# Patient Record
Sex: FEMALE | Race: WHITE | NOT HISPANIC OR LATINO | URBAN - METROPOLITAN AREA
[De-identification: names, ages, dates, MRNs, and addresses within clinical notes are randomized per-mention and may not be internally consistent; named-entity substitution may affect disease eponyms.]

---

## 2019-12-24 NOTE — PATIENT DISCUSSION
Start Maxitrol gtts qid OD for 1 week and Keflex 500mg bid PO for 1 week. bothe E-Rx sent to pharmacy.

## 2020-02-24 ENCOUNTER — NEW PATIENT (OUTPATIENT)
Dept: URBAN - METROPOLITAN AREA CLINIC 26 | Facility: CLINIC | Age: 55
End: 2020-02-24

## 2020-02-24 VITALS
DIASTOLIC BLOOD PRESSURE: 118 MMHG | WEIGHT: 261 LBS | HEART RATE: 99 BPM | SYSTOLIC BLOOD PRESSURE: 177 MMHG | BODY MASS INDEX: 49.28 KG/M2 | HEIGHT: 61 IN

## 2020-02-24 DIAGNOSIS — H34.8310: ICD-10-CM

## 2020-02-24 DIAGNOSIS — I10: ICD-10-CM

## 2020-02-24 DIAGNOSIS — H40.1131: ICD-10-CM

## 2020-02-24 DIAGNOSIS — H35.372: ICD-10-CM

## 2020-02-24 PROCEDURE — 92250 FUNDUS PHOTOGRAPHY W/I&R: CPT

## 2020-02-24 PROCEDURE — 99204 OFFICE O/P NEW MOD 45 MIN: CPT

## 2020-02-24 PROCEDURE — 92134 CPTRZ OPH DX IMG PST SGM RTA: CPT

## 2020-02-24 ASSESSMENT — VISUAL ACUITY
OS_PH: 20/50-2
OD_PH: 20/100+2
OS_SC: 20/400-2
OS_CC: 20/15-1
OD_SC: CF 4FT
OD_CC: 20/40+2

## 2020-02-24 ASSESSMENT — TONOMETRY
OD_IOP_MMHG: 20
OS_IOP_MMHG: 19

## 2020-02-27 ENCOUNTER — CLINIC PROCEDURE ONLY (OUTPATIENT)
Dept: URBAN - METROPOLITAN AREA CLINIC 26 | Facility: CLINIC | Age: 55
End: 2020-02-27

## 2020-02-27 DIAGNOSIS — H34.8310: ICD-10-CM

## 2020-02-27 PROCEDURE — 67028 INJECTION EYE DRUG: CPT

## 2020-02-27 ASSESSMENT — VISUAL ACUITY: OD_CC: 20/40

## 2020-02-27 ASSESSMENT — TONOMETRY: OD_IOP_MMHG: 16

## 2020-04-02 ENCOUNTER — CLINICAL PROCEDURE AND DIAGNOSTIC TESTING ONLY (OUTPATIENT)
Dept: URBAN - METROPOLITAN AREA CLINIC 26 | Facility: CLINIC | Age: 55
End: 2020-04-02

## 2020-04-02 DIAGNOSIS — H35.372: ICD-10-CM

## 2020-04-02 DIAGNOSIS — H34.8310: ICD-10-CM

## 2020-04-02 PROCEDURE — 92250 FUNDUS PHOTOGRAPHY W/I&R: CPT

## 2020-04-02 PROCEDURE — 67028 INJECTION EYE DRUG: CPT

## 2020-04-02 ASSESSMENT — TONOMETRY: OD_IOP_MMHG: 20

## 2020-04-02 ASSESSMENT — VISUAL ACUITY: OD_CC: 20/30-1

## 2020-05-07 ENCOUNTER — CLINICAL PROCEDURE AND DIAGNOSTIC TESTING ONLY (OUTPATIENT)
Dept: URBAN - METROPOLITAN AREA CLINIC 26 | Facility: CLINIC | Age: 55
End: 2020-05-07

## 2020-05-07 DIAGNOSIS — H34.8310: ICD-10-CM

## 2020-05-07 DIAGNOSIS — H35.372: ICD-10-CM

## 2020-05-07 PROCEDURE — 67028 INJECTION EYE DRUG: CPT

## 2020-05-07 PROCEDURE — 92134 CPTRZ OPH DX IMG PST SGM RTA: CPT

## 2020-05-07 ASSESSMENT — TONOMETRY: OD_IOP_MMHG: 17

## 2020-05-07 ASSESSMENT — VISUAL ACUITY: OD_CC: 20/30+2

## 2020-06-10 ENCOUNTER — CLINICAL PROCEDURE AND DIAGNOSTIC TESTING ONLY (OUTPATIENT)
Dept: URBAN - METROPOLITAN AREA CLINIC 26 | Facility: CLINIC | Age: 55
End: 2020-06-10

## 2020-06-10 DIAGNOSIS — H35.372: ICD-10-CM

## 2020-06-10 DIAGNOSIS — H34.8310: ICD-10-CM

## 2020-06-10 PROCEDURE — 92250 FUNDUS PHOTOGRAPHY W/I&R: CPT

## 2020-06-10 PROCEDURE — 67028 INJECTION EYE DRUG: CPT

## 2020-06-10 ASSESSMENT — TONOMETRY: OD_IOP_MMHG: 19

## 2020-06-10 ASSESSMENT — VISUAL ACUITY: OD_CC: 20/25

## 2020-07-21 ENCOUNTER — FOLLOW UP AND POST INJECTION EVALUATION (OUTPATIENT)
Dept: URBAN - METROPOLITAN AREA CLINIC 26 | Facility: CLINIC | Age: 55
End: 2020-07-21

## 2020-07-21 DIAGNOSIS — H34.8310: ICD-10-CM

## 2020-07-21 DIAGNOSIS — H40.1131: ICD-10-CM

## 2020-07-21 DIAGNOSIS — I10: ICD-10-CM

## 2020-07-21 DIAGNOSIS — H35.372: ICD-10-CM

## 2020-07-21 PROCEDURE — 67028 INJECTION EYE DRUG: CPT

## 2020-07-21 PROCEDURE — 92134 CPTRZ OPH DX IMG PST SGM RTA: CPT

## 2020-07-21 PROCEDURE — 92250 FUNDUS PHOTOGRAPHY W/I&R: CPT

## 2020-07-21 PROCEDURE — 92014 COMPRE OPH EXAM EST PT 1/>: CPT

## 2020-07-21 ASSESSMENT — VISUAL ACUITY
OS_CC: 20/20
OD_CC: 20/25-2

## 2020-07-21 ASSESSMENT — TONOMETRY
OS_IOP_MMHG: 19
OD_IOP_MMHG: 20

## 2020-08-25 ENCOUNTER — CLINIC PROCEDURE ONLY (OUTPATIENT)
Dept: URBAN - METROPOLITAN AREA CLINIC 26 | Facility: CLINIC | Age: 55
End: 2020-08-25

## 2020-08-25 DIAGNOSIS — H34.8310: ICD-10-CM

## 2020-08-25 DIAGNOSIS — H35.372: ICD-10-CM

## 2020-08-25 PROCEDURE — 67028 INJECTION EYE DRUG: CPT

## 2020-08-25 PROCEDURE — 92250 FUNDUS PHOTOGRAPHY W/I&R: CPT

## 2020-08-25 ASSESSMENT — TONOMETRY: OD_IOP_MMHG: 17

## 2020-08-25 ASSESSMENT — VISUAL ACUITY: OD_CC: 20/30

## 2020-09-29 ENCOUNTER — CLINICAL PROCEDURE AND DIAGNOSTIC TESTING ONLY (OUTPATIENT)
Dept: URBAN - METROPOLITAN AREA CLINIC 26 | Facility: CLINIC | Age: 55
End: 2020-09-29

## 2020-09-29 DIAGNOSIS — H34.8310: ICD-10-CM

## 2020-09-29 DIAGNOSIS — H35.372: ICD-10-CM

## 2020-09-29 PROCEDURE — 92134 CPTRZ OPH DX IMG PST SGM RTA: CPT

## 2020-09-29 PROCEDURE — 67028 INJECTION EYE DRUG: CPT

## 2020-09-29 ASSESSMENT — TONOMETRY: OD_IOP_MMHG: 15

## 2020-09-29 ASSESSMENT — VISUAL ACUITY: OD_CC: 20/25

## 2020-11-03 ENCOUNTER — CLINICAL PROCEDURE AND DIAGNOSTIC TESTING ONLY (OUTPATIENT)
Dept: URBAN - METROPOLITAN AREA CLINIC 26 | Facility: CLINIC | Age: 55
End: 2020-11-03

## 2020-11-03 DIAGNOSIS — H34.8310: ICD-10-CM

## 2020-11-03 DIAGNOSIS — H35.372: ICD-10-CM

## 2020-11-03 PROCEDURE — 92250 FUNDUS PHOTOGRAPHY W/I&R: CPT

## 2020-11-03 PROCEDURE — 67028 INJECTION EYE DRUG: CPT

## 2020-11-03 ASSESSMENT — VISUAL ACUITY: OD_CC: 20/25+2

## 2020-11-03 ASSESSMENT — TONOMETRY: OD_IOP_MMHG: 20

## 2020-12-08 ENCOUNTER — FOLLOW UP AND POST INJECTION EVALUATION (OUTPATIENT)
Dept: URBAN - METROPOLITAN AREA CLINIC 26 | Facility: CLINIC | Age: 55
End: 2020-12-08

## 2020-12-08 DIAGNOSIS — H35.372: ICD-10-CM

## 2020-12-08 DIAGNOSIS — H04.123: ICD-10-CM

## 2020-12-08 DIAGNOSIS — I10: ICD-10-CM

## 2020-12-08 DIAGNOSIS — H34.8310: ICD-10-CM

## 2020-12-08 DIAGNOSIS — H02.834: ICD-10-CM

## 2020-12-08 DIAGNOSIS — H02.831: ICD-10-CM

## 2020-12-08 DIAGNOSIS — H40.1131: ICD-10-CM

## 2020-12-08 PROCEDURE — 92134 CPTRZ OPH DX IMG PST SGM RTA: CPT

## 2020-12-08 PROCEDURE — 92250 FUNDUS PHOTOGRAPHY W/I&R: CPT

## 2020-12-08 PROCEDURE — 67028 INJECTION EYE DRUG: CPT

## 2020-12-08 PROCEDURE — 92014 COMPRE OPH EXAM EST PT 1/>: CPT

## 2020-12-08 ASSESSMENT — VISUAL ACUITY
OS_CC: 20/20
OD_CC: 20/20-1

## 2020-12-08 ASSESSMENT — TONOMETRY
OD_IOP_MMHG: 16
OS_IOP_MMHG: 17

## 2021-01-14 NOTE — PATIENT DISCUSSION
Monitor. Pt to call with any vision changes, increase in floaters, or if seeing flashes, dark shadows, or a curtain/veil across vision.

## 2021-01-18 ENCOUNTER — CLINICAL PROCEDURE AND DIAGNOSTIC TESTING ONLY (OUTPATIENT)
Dept: URBAN - METROPOLITAN AREA CLINIC 26 | Facility: CLINIC | Age: 56
End: 2021-01-18

## 2021-01-18 DIAGNOSIS — H34.8310: ICD-10-CM

## 2021-01-18 DIAGNOSIS — H35.372: ICD-10-CM

## 2021-01-18 PROCEDURE — 92250 FUNDUS PHOTOGRAPHY W/I&R: CPT

## 2021-01-18 PROCEDURE — 67028 INJECTION EYE DRUG: CPT

## 2021-01-18 ASSESSMENT — TONOMETRY
OD_IOP_MMHG: 23
OD_IOP_MMHG: 20

## 2021-01-18 ASSESSMENT — VISUAL ACUITY: OD_CC: 20/30+2

## 2021-02-23 PROBLEM — D25.9 FIBROID UTERUS: Status: ACTIVE | Noted: 2021-02-23

## 2021-03-08 ENCOUNTER — CLINICAL PROCEDURE AND DIAGNOSTIC TESTING ONLY (OUTPATIENT)
Dept: URBAN - METROPOLITAN AREA CLINIC 26 | Facility: CLINIC | Age: 56
End: 2021-03-08

## 2021-03-08 DIAGNOSIS — H35.372: ICD-10-CM

## 2021-03-08 DIAGNOSIS — H34.8310: ICD-10-CM

## 2021-03-08 PROCEDURE — 67028 INJECTION EYE DRUG: CPT

## 2021-03-08 PROCEDURE — 92250 FUNDUS PHOTOGRAPHY W/I&R: CPT

## 2021-03-08 PROCEDURE — 92134 CPTRZ OPH DX IMG PST SGM RTA: CPT

## 2021-03-08 ASSESSMENT — TONOMETRY: OD_IOP_MMHG: 20

## 2021-03-08 ASSESSMENT — VISUAL ACUITY: OD_CC: 20/20-1

## 2021-03-09 PROBLEM — T81.30XA SUPERFICIAL DEHISCENCE OF WOUND: Status: ACTIVE | Noted: 2021-03-09

## 2021-05-03 ENCOUNTER — FOLLOW UP AND POST INJECTION EVALUATION (OUTPATIENT)
Dept: URBAN - METROPOLITAN AREA CLINIC 26 | Facility: CLINIC | Age: 56
End: 2021-05-03

## 2021-05-03 VITALS — HEIGHT: 55 IN | SYSTOLIC BLOOD PRESSURE: 126 MMHG | HEART RATE: 89 BPM | DIASTOLIC BLOOD PRESSURE: 96 MMHG

## 2021-05-03 DIAGNOSIS — H02.834: ICD-10-CM

## 2021-05-03 DIAGNOSIS — H34.8310: ICD-10-CM

## 2021-05-03 DIAGNOSIS — H02.831: ICD-10-CM

## 2021-05-03 DIAGNOSIS — H35.372: ICD-10-CM

## 2021-05-03 DIAGNOSIS — H40.1131: ICD-10-CM

## 2021-05-03 DIAGNOSIS — H04.123: ICD-10-CM

## 2021-05-03 DIAGNOSIS — I10: ICD-10-CM

## 2021-05-03 PROCEDURE — 92134 CPTRZ OPH DX IMG PST SGM RTA: CPT

## 2021-05-03 PROCEDURE — 92014 COMPRE OPH EXAM EST PT 1/>: CPT

## 2021-05-03 PROCEDURE — 92250 FUNDUS PHOTOGRAPHY W/I&R: CPT

## 2021-05-03 ASSESSMENT — TONOMETRY
OS_IOP_MMHG: 26
OD_IOP_MMHG: 21
OD_IOP_MMHG: 27
OS_IOP_MMHG: 24
OD_IOP_MMHG: 24
OS_IOP_MMHG: 22

## 2021-05-03 ASSESSMENT — VISUAL ACUITY
OS_CC: 20/20-1
OD_CC: 20/20-1

## 2021-05-04 ENCOUNTER — ADDENDUM (OUTPATIENT)
Dept: URBAN - METROPOLITAN AREA CLINIC 26 | Facility: CLINIC | Age: 56
End: 2021-05-04

## 2021-05-11 ENCOUNTER — CLINICAL PROCEDURE AND DIAGNOSTIC TESTING ONLY (OUTPATIENT)
Dept: URBAN - METROPOLITAN AREA CLINIC 26 | Facility: CLINIC | Age: 56
End: 2021-05-11

## 2021-05-11 DIAGNOSIS — H34.8310: ICD-10-CM

## 2021-05-11 PROCEDURE — 67028 INJECTION EYE DRUG: CPT

## 2021-05-11 ASSESSMENT — VISUAL ACUITY: OD_CC: 20/20-2

## 2021-05-11 ASSESSMENT — TONOMETRY
OS_IOP_MMHG: 18
OD_IOP_MMHG: 22

## 2021-07-20 ENCOUNTER — CLINICAL PROCEDURE AND DIAGNOSTIC TESTING ONLY (OUTPATIENT)
Dept: URBAN - METROPOLITAN AREA CLINIC 26 | Facility: CLINIC | Age: 56
End: 2021-07-20

## 2021-07-20 DIAGNOSIS — H35.372: ICD-10-CM

## 2021-07-20 DIAGNOSIS — H34.8310: ICD-10-CM

## 2021-07-20 PROCEDURE — 92134 CPTRZ OPH DX IMG PST SGM RTA: CPT

## 2021-07-20 PROCEDURE — 67028 INJECTION EYE DRUG: CPT

## 2021-07-20 PROCEDURE — 92250 FUNDUS PHOTOGRAPHY W/I&R: CPT

## 2021-07-20 ASSESSMENT — TONOMETRY: OD_IOP_MMHG: 16

## 2021-07-20 ASSESSMENT — VISUAL ACUITY: OD_CC: 20/30

## 2021-10-05 ENCOUNTER — FOLLOW UP AND POST INJECTION EVALUATION (OUTPATIENT)
Dept: URBAN - METROPOLITAN AREA CLINIC 26 | Facility: CLINIC | Age: 56
End: 2021-10-05

## 2021-10-05 VITALS — WEIGHT: 263.4 LBS | HEIGHT: 60 IN | BODY MASS INDEX: 51.71 KG/M2

## 2021-10-05 DIAGNOSIS — H35.372: ICD-10-CM

## 2021-10-05 DIAGNOSIS — H40.1131: ICD-10-CM

## 2021-10-05 DIAGNOSIS — H04.123: ICD-10-CM

## 2021-10-05 DIAGNOSIS — H34.8310: ICD-10-CM

## 2021-10-05 DIAGNOSIS — I10: ICD-10-CM

## 2021-10-05 PROCEDURE — 92250 FUNDUS PHOTOGRAPHY W/I&R: CPT

## 2021-10-05 PROCEDURE — 92014 COMPRE OPH EXAM EST PT 1/>: CPT

## 2021-10-05 PROCEDURE — 92134 CPTRZ OPH DX IMG PST SGM RTA: CPT

## 2021-10-05 PROCEDURE — 67028 INJECTION EYE DRUG: CPT

## 2021-10-05 ASSESSMENT — VISUAL ACUITY
OS_CC: 20/20
OD_CC: 20/20

## 2021-10-05 ASSESSMENT — TONOMETRY
OS_IOP_MMHG: 18
OD_IOP_MMHG: 18

## 2021-12-08 ENCOUNTER — CLINIC PROCEDURE ONLY (OUTPATIENT)
Dept: URBAN - METROPOLITAN AREA CLINIC 26 | Facility: CLINIC | Age: 56
End: 2021-12-08

## 2021-12-08 DIAGNOSIS — H40.1131: ICD-10-CM

## 2021-12-08 DIAGNOSIS — H34.8310: ICD-10-CM

## 2021-12-08 DIAGNOSIS — H35.372: ICD-10-CM

## 2021-12-08 PROCEDURE — 92134 CPTRZ OPH DX IMG PST SGM RTA: CPT

## 2021-12-08 PROCEDURE — 92250 FUNDUS PHOTOGRAPHY W/I&R: CPT

## 2021-12-08 PROCEDURE — 67028 INJECTION EYE DRUG: CPT

## 2021-12-08 ASSESSMENT — VISUAL ACUITY: OD_CC: 20/20-2

## 2021-12-08 ASSESSMENT — TONOMETRY: OD_IOP_MMHG: 18

## 2022-02-08 ENCOUNTER — ADDENDUM (OUTPATIENT)
Dept: URBAN - METROPOLITAN AREA CLINIC 26 | Facility: CLINIC | Age: 57
End: 2022-02-08

## 2022-02-09 ENCOUNTER — CLINIC PROCEDURE ONLY (OUTPATIENT)
Dept: URBAN - METROPOLITAN AREA CLINIC 26 | Facility: CLINIC | Age: 57
End: 2022-02-09

## 2022-02-09 DIAGNOSIS — H35.372: ICD-10-CM

## 2022-02-09 DIAGNOSIS — H34.8310: ICD-10-CM

## 2022-02-09 PROCEDURE — 92134 CPTRZ OPH DX IMG PST SGM RTA: CPT

## 2022-02-09 PROCEDURE — 67028 INJECTION EYE DRUG: CPT

## 2022-02-09 PROCEDURE — 92250 FUNDUS PHOTOGRAPHY W/I&R: CPT

## 2022-02-09 ASSESSMENT — TONOMETRY: OD_IOP_MMHG: 20

## 2022-04-20 ENCOUNTER — FOLLOW UP (OUTPATIENT)
Dept: URBAN - METROPOLITAN AREA CLINIC 26 | Facility: CLINIC | Age: 57
End: 2022-04-20

## 2022-04-20 VITALS — BODY MASS INDEX: 52.61 KG/M2 | HEIGHT: 60 IN | WEIGHT: 268 LBS

## 2022-04-20 DIAGNOSIS — H04.123: ICD-10-CM

## 2022-04-20 DIAGNOSIS — H34.8310: ICD-10-CM

## 2022-04-20 DIAGNOSIS — H35.372: ICD-10-CM

## 2022-04-20 DIAGNOSIS — I10: ICD-10-CM

## 2022-04-20 DIAGNOSIS — H40.1131: ICD-10-CM

## 2022-04-20 PROCEDURE — 67028 INJECTION EYE DRUG: CPT

## 2022-04-20 PROCEDURE — 92250 FUNDUS PHOTOGRAPHY W/I&R: CPT

## 2022-04-20 PROCEDURE — 92134 CPTRZ OPH DX IMG PST SGM RTA: CPT

## 2022-04-20 PROCEDURE — 92014 COMPRE OPH EXAM EST PT 1/>: CPT

## 2022-04-20 ASSESSMENT — VISUAL ACUITY
OS_CC: 20/20-1
OD_CC: 20/20-2

## 2022-04-20 ASSESSMENT — TONOMETRY
OD_IOP_MMHG: 17
OS_IOP_MMHG: 18

## 2022-05-13 NOTE — PATIENT DISCUSSION
Monitor for changes. Pt to call with any vision changes, increase in floaters, or if seeing flashes, dark shadows, or a curtain/veil across vision.

## 2022-07-15 ENCOUNTER — CLINIC PROCEDURE ONLY (OUTPATIENT)
Dept: URBAN - METROPOLITAN AREA CLINIC 26 | Facility: CLINIC | Age: 57
End: 2022-07-15

## 2022-07-15 DIAGNOSIS — H34.8310: ICD-10-CM

## 2022-07-15 DIAGNOSIS — H35.372: ICD-10-CM

## 2022-07-15 PROCEDURE — 92134 CPTRZ OPH DX IMG PST SGM RTA: CPT

## 2022-07-15 PROCEDURE — 67028 INJECTION EYE DRUG: CPT

## 2022-07-15 PROCEDURE — 92250 FUNDUS PHOTOGRAPHY W/I&R: CPT

## 2022-07-15 ASSESSMENT — TONOMETRY: OD_IOP_MMHG: 19

## 2022-09-23 ENCOUNTER — CLINIC PROCEDURE ONLY (OUTPATIENT)
Dept: URBAN - METROPOLITAN AREA CLINIC 26 | Facility: CLINIC | Age: 57
End: 2022-09-23

## 2022-09-23 VITALS
DIASTOLIC BLOOD PRESSURE: 87 MMHG | WEIGHT: 273 LBS | SYSTOLIC BLOOD PRESSURE: 124 MMHG | HEART RATE: 108 BPM | BODY MASS INDEX: 53.6 KG/M2 | HEIGHT: 60 IN

## 2022-09-23 DIAGNOSIS — H04.123: ICD-10-CM

## 2022-09-23 DIAGNOSIS — H40.1131: ICD-10-CM

## 2022-09-23 DIAGNOSIS — H35.372: ICD-10-CM

## 2022-09-23 DIAGNOSIS — H34.8310: ICD-10-CM

## 2022-09-23 DIAGNOSIS — I10: ICD-10-CM

## 2022-09-23 PROCEDURE — 92250 FUNDUS PHOTOGRAPHY W/I&R: CPT

## 2022-09-23 PROCEDURE — 92014 COMPRE OPH EXAM EST PT 1/>: CPT

## 2022-09-23 PROCEDURE — 92134 CPTRZ OPH DX IMG PST SGM RTA: CPT

## 2022-09-23 PROCEDURE — 67028 INJECTION EYE DRUG: CPT

## 2022-09-23 ASSESSMENT — TONOMETRY
OS_IOP_MMHG: 14
OD_IOP_MMHG: 14

## 2022-09-23 ASSESSMENT — VISUAL ACUITY
OD_CC: 20/20-2
OS_CC: 20/20

## 2022-12-02 ENCOUNTER — CLINIC PROCEDURE ONLY (OUTPATIENT)
Dept: URBAN - METROPOLITAN AREA CLINIC 26 | Facility: CLINIC | Age: 57
End: 2022-12-02

## 2022-12-02 PROCEDURE — 92250 FUNDUS PHOTOGRAPHY W/I&R: CPT

## 2022-12-02 PROCEDURE — 67028 INJECTION EYE DRUG: CPT

## 2022-12-02 PROCEDURE — 92134 CPTRZ OPH DX IMG PST SGM RTA: CPT

## 2023-04-21 ENCOUNTER — CLINIC PROCEDURE ONLY (OUTPATIENT)
Dept: URBAN - METROPOLITAN AREA CLINIC 26 | Facility: CLINIC | Age: 58
End: 2023-04-21

## 2023-04-21 DIAGNOSIS — H35.372: ICD-10-CM

## 2023-04-21 DIAGNOSIS — H34.8310: ICD-10-CM

## 2023-04-21 PROCEDURE — 67028 INJECTION EYE DRUG: CPT

## 2023-04-21 PROCEDURE — 92250 FUNDUS PHOTOGRAPHY W/I&R: CPT

## 2023-04-21 PROCEDURE — 92134 CPTRZ OPH DX IMG PST SGM RTA: CPT

## 2023-04-21 ASSESSMENT — TONOMETRY: OD_IOP_MMHG: 19

## 2023-06-23 ENCOUNTER — COMPREHENSIVE EXAM (OUTPATIENT)
Dept: URBAN - METROPOLITAN AREA CLINIC 26 | Facility: CLINIC | Age: 58
End: 2023-06-23

## 2023-06-23 DIAGNOSIS — H04.123: ICD-10-CM

## 2023-06-23 DIAGNOSIS — H35.372: ICD-10-CM

## 2023-06-23 DIAGNOSIS — H34.8310: ICD-10-CM

## 2023-06-23 DIAGNOSIS — H40.1131: ICD-10-CM

## 2023-06-23 DIAGNOSIS — I10: ICD-10-CM

## 2023-06-23 PROCEDURE — 92250 FUNDUS PHOTOGRAPHY W/I&R: CPT

## 2023-06-23 PROCEDURE — 92134 CPTRZ OPH DX IMG PST SGM RTA: CPT

## 2023-06-23 PROCEDURE — 92014 COMPRE OPH EXAM EST PT 1/>: CPT

## 2023-06-23 PROCEDURE — 67028 INJECTION EYE DRUG: CPT

## 2023-06-23 ASSESSMENT — VISUAL ACUITY
OS_CC: 20/25+1
OD_CC: 20/25+2

## 2023-06-23 ASSESSMENT — TONOMETRY
OS_IOP_MMHG: 20
OD_IOP_MMHG: 18

## 2023-08-30 ENCOUNTER — CLINIC PROCEDURE ONLY (OUTPATIENT)
Dept: URBAN - METROPOLITAN AREA CLINIC 26 | Facility: CLINIC | Age: 58
End: 2023-08-30

## 2023-08-30 DIAGNOSIS — H34.8310: ICD-10-CM

## 2023-08-30 DIAGNOSIS — H40.1131: ICD-10-CM

## 2023-08-30 DIAGNOSIS — H35.372: ICD-10-CM

## 2023-08-30 PROCEDURE — 92250 FUNDUS PHOTOGRAPHY W/I&R: CPT

## 2023-08-30 PROCEDURE — 67028 INJECTION EYE DRUG: CPT

## 2023-08-30 PROCEDURE — 92134 CPTRZ OPH DX IMG PST SGM RTA: CPT

## 2023-11-08 ENCOUNTER — CLINIC PROCEDURE ONLY (OUTPATIENT)
Dept: URBAN - METROPOLITAN AREA CLINIC 26 | Facility: CLINIC | Age: 58
End: 2023-11-08

## 2023-11-08 DIAGNOSIS — H34.8310: ICD-10-CM

## 2023-11-08 DIAGNOSIS — H40.1131: ICD-10-CM

## 2023-11-08 DIAGNOSIS — H35.372: ICD-10-CM

## 2023-11-08 PROCEDURE — 92134 CPTRZ OPH DX IMG PST SGM RTA: CPT

## 2023-11-08 PROCEDURE — 92250 FUNDUS PHOTOGRAPHY W/I&R: CPT

## 2023-11-08 PROCEDURE — 67028 INJECTION EYE DRUG: CPT

## 2023-11-08 ASSESSMENT — TONOMETRY: OD_IOP_MMHG: 18

## 2024-01-17 ENCOUNTER — FOLLOW UP (OUTPATIENT)
Dept: URBAN - METROPOLITAN AREA CLINIC 26 | Facility: CLINIC | Age: 59
End: 2024-01-17

## 2024-01-17 VITALS — WEIGHT: 267 LBS | HEIGHT: 60 IN | BODY MASS INDEX: 52.42 KG/M2

## 2024-01-17 DIAGNOSIS — H40.1131: ICD-10-CM

## 2024-01-17 DIAGNOSIS — H02.831: ICD-10-CM

## 2024-01-17 DIAGNOSIS — H04.123: ICD-10-CM

## 2024-01-17 DIAGNOSIS — H34.8310: ICD-10-CM

## 2024-01-17 DIAGNOSIS — H35.372: ICD-10-CM

## 2024-01-17 DIAGNOSIS — I10: ICD-10-CM

## 2024-01-17 DIAGNOSIS — H02.834: ICD-10-CM

## 2024-01-17 DIAGNOSIS — H21.02: ICD-10-CM

## 2024-01-17 PROCEDURE — 92014 COMPRE OPH EXAM EST PT 1/>: CPT

## 2024-01-17 PROCEDURE — 92134 CPTRZ OPH DX IMG PST SGM RTA: CPT

## 2024-01-17 PROCEDURE — 92250 FUNDUS PHOTOGRAPHY W/I&R: CPT

## 2024-01-17 ASSESSMENT — TONOMETRY
OD_IOP_MMHG: 23
OS_IOP_MMHG: 35
OS_IOP_MMHG: 21
OD_IOP_MMHG: 22
OD_IOP_MMHG: 14
OS_IOP_MMHG: 26

## 2024-01-17 ASSESSMENT — VISUAL ACUITY
OS_CC: 20/20
OD_CC: 20/25+2

## 2024-02-07 ENCOUNTER — ADDENDUM (OUTPATIENT)
Dept: URBAN - METROPOLITAN AREA CLINIC 26 | Facility: CLINIC | Age: 59
End: 2024-02-07

## 2024-02-07 ENCOUNTER — FOLLOW UP (OUTPATIENT)
Dept: URBAN - METROPOLITAN AREA CLINIC 26 | Facility: CLINIC | Age: 59
End: 2024-02-07

## 2024-02-07 VITALS — HEIGHT: 60 IN | WEIGHT: 266 LBS | BODY MASS INDEX: 52.22 KG/M2

## 2024-02-07 DIAGNOSIS — H40.1131: ICD-10-CM

## 2024-02-07 DIAGNOSIS — H04.123: ICD-10-CM

## 2024-02-07 DIAGNOSIS — H21.02: ICD-10-CM

## 2024-02-07 DIAGNOSIS — H02.834: ICD-10-CM

## 2024-02-07 DIAGNOSIS — I10: ICD-10-CM

## 2024-02-07 DIAGNOSIS — H02.831: ICD-10-CM

## 2024-02-07 DIAGNOSIS — H35.372: ICD-10-CM

## 2024-02-07 DIAGNOSIS — H34.8310: ICD-10-CM

## 2024-02-07 PROCEDURE — 92134 CPTRZ OPH DX IMG PST SGM RTA: CPT

## 2024-02-07 PROCEDURE — 92014 COMPRE OPH EXAM EST PT 1/>: CPT

## 2024-02-07 ASSESSMENT — VISUAL ACUITY
OD_CC: 20/20-2
OS_CC: 20/20

## 2024-02-07 ASSESSMENT — TONOMETRY
OD_IOP_MMHG: 22
OS_IOP_MMHG: 22

## 2024-02-24 ENCOUNTER — OFFICE VISIT (OUTPATIENT)
Dept: URGENT CARE | Facility: CLINIC | Age: 59
End: 2024-02-24
Payer: COMMERCIAL

## 2024-02-24 VITALS
DIASTOLIC BLOOD PRESSURE: 92 MMHG | WEIGHT: 212 LBS | OXYGEN SATURATION: 97 % | SYSTOLIC BLOOD PRESSURE: 149 MMHG | RESPIRATION RATE: 17 BRPM | BODY MASS INDEX: 36.19 KG/M2 | TEMPERATURE: 98 F | HEIGHT: 64 IN | HEART RATE: 77 BPM

## 2024-02-24 DIAGNOSIS — R50.9 FEVER, UNSPECIFIED FEVER CAUSE: ICD-10-CM

## 2024-02-24 DIAGNOSIS — U07.1 COVID: Primary | ICD-10-CM

## 2024-02-24 LAB
CTP QC/QA: YES
CTP QC/QA: YES
POC MOLECULAR INFLUENZA A AGN: NEGATIVE
POC MOLECULAR INFLUENZA B AGN: NEGATIVE
SARS-COV-2 AG RESP QL IA.RAPID: POSITIVE

## 2024-02-24 PROCEDURE — 87502 INFLUENZA DNA AMP PROBE: CPT | Mod: QW,S$GLB,, | Performed by: PHYSICIAN ASSISTANT

## 2024-02-24 PROCEDURE — 87811 SARS-COV-2 COVID19 W/OPTIC: CPT | Mod: QW,S$GLB,, | Performed by: PHYSICIAN ASSISTANT

## 2024-02-24 PROCEDURE — 99203 OFFICE O/P NEW LOW 30 MIN: CPT | Mod: S$GLB,,, | Performed by: PHYSICIAN ASSISTANT

## 2024-02-24 RX ORDER — NIRMATRELVIR AND RITONAVIR 300-100 MG
KIT ORAL
Qty: 30 TABLET | Refills: 0 | Status: SHIPPED | OUTPATIENT
Start: 2024-02-24 | End: 2024-02-29

## 2024-02-24 RX ORDER — METOPROLOL TARTRATE 100 MG/1
100 TABLET ORAL 2 TIMES DAILY
COMMUNITY
Start: 2024-02-22

## 2024-02-24 NOTE — LETTER
February 24, 2024      [facility]  [address] [place], [address]  [place] [address]  Phone: [phone]  Fax: [phone]       Patient: Jennifer Briones   YOB: 1965  Date of Visit: 02/24/2024    To Whom It May Concern:    Yan Briones  was at [facility] on 02/24/2024. The patient may return to work/school on 02/28/2024 with restrictions. Patient should wear mask until 03/04/2024. If you have any questions or concerns, or if I can be of further assistance, please do not hesitate to contact me.    Sincerely,    Adam Guallpa PA-C

## 2024-02-24 NOTE — PROGRESS NOTES
"Subjective:      Patient ID: Jennifer Briones is a 58 y.o. female.    Vitals:  height is 5' 4" (1.626 m) and weight is 96.2 kg (212 lb). Her oral temperature is 98.3 °F (36.8 °C). Her blood pressure is 149/92 (abnormal) and her pulse is 77. Her respiration is 17 and oxygen saturation is 97%.     Chief Complaint: Fever    Pt is coming in for fever and congestion that began two days ago. Pt states yesterday was the worst since pt got light headed.    Fever   This is a new problem. The current episode started in the past 7 days. The problem occurs 2 to 4 times per day. The problem has been gradually worsening. The maximum temperature noted was 102 to 102.9 F. The temperature was taken using an oral thermometer. Associated symptoms include congestion, coughing, diarrhea (slight), headaches, muscle aches and nausea (slight). Pertinent negatives include no ear pain, sore throat or vomiting. She has tried nothing for the symptoms. The treatment provided no relief.   Risk factors: no recent travel        Constitution: Positive for fever.   HENT:  Positive for congestion. Negative for ear pain and sore throat.    Respiratory:  Positive for cough.    Gastrointestinal:  Positive for nausea (slight) and diarrhea (slight). Negative for vomiting.   Neurological:  Positive for headaches.      Objective:     Physical Exam   Constitutional: She is oriented to person, place, and time. She appears well-developed. She is cooperative.  Non-toxic appearance. She does not appear ill. No distress. obesity  HENT:   Head: Normocephalic and atraumatic.   Ears:   Right Ear: Hearing, tympanic membrane, external ear and ear canal normal.   Left Ear: Hearing, tympanic membrane, external ear and ear canal normal.   Nose: Nose normal. No mucosal edema, rhinorrhea or nasal deformity. No epistaxis. Right sinus exhibits no maxillary sinus tenderness and no frontal sinus tenderness. Left sinus exhibits no maxillary sinus tenderness and no frontal sinus " tenderness.   Mouth/Throat: Uvula is midline, oropharynx is clear and moist and mucous membranes are normal. No trismus in the jaw. Normal dentition. No uvula swelling. No oropharyngeal exudate, posterior oropharyngeal edema or posterior oropharyngeal erythema.   Eyes: Conjunctivae and lids are normal. No scleral icterus.   Neck: Trachea normal and phonation normal. Neck supple. No edema present. No erythema present. No neck rigidity present.   Cardiovascular: Normal rate, regular rhythm, normal heart sounds and normal pulses.   Pulmonary/Chest: Effort normal and breath sounds normal. No respiratory distress. She has no decreased breath sounds. She has no rhonchi.   Abdominal: Normal appearance.   Musculoskeletal: Normal range of motion.         General: No deformity. Normal range of motion.   Neurological: She is alert and oriented to person, place, and time. She exhibits normal muscle tone. Coordination normal.   Skin: Skin is warm, dry, intact, not diaphoretic and not pale.   Psychiatric: Her speech is normal and behavior is normal. Judgment and thought content normal.   Nursing note and vitals reviewed.      Assessment:     1. COVID    2. Fever, unspecified fever cause        Plan:       COVID  -     nirmatrelvir-ritonavir (PAXLOVID) 300 mg (150 mg x 2)-100 mg copackaged tablets (EUA); Take 3 tablets by mouth 2 (two) times daily. Each dose contains 2 nirmatrelvir (pink tablets) and 1 ritonavir (white tablet). Take all 3 tablets together  Dispense: 30 tablet; Refill: 0    Fever, unspecified fever cause  -     POCT Influenza A/B MOLECULAR  -     SARS Coronavirus 2 Antigen, POCT Manual Read        Results for orders placed or performed in visit on 02/24/24   POCT Influenza A/B MOLECULAR   Result Value Ref Range    POC Molecular Influenza A Ag Negative Negative, Not Reported    POC Molecular Influenza B Ag Negative Negative, Not Reported     Acceptable Yes    SARS Coronavirus 2 Antigen, POCT Manual  Read   Result Value Ref Range    SARS Coronavirus 2 Antigen Positive (A) Negative     Acceptable Yes

## 2024-02-27 ENCOUNTER — TELEPHONE (OUTPATIENT)
Dept: URGENT CARE | Facility: CLINIC | Age: 59
End: 2024-02-27
Payer: COMMERCIAL

## 2024-02-27 NOTE — TELEPHONE ENCOUNTER
Patient and  called clinic to discuss visit from 2/24/24 for Adam Guallpa PA-C. Stated patient tested positive for covid here on 2/24/24 and prescribed Paxlovid. Pt followed up with her PCP two days later and tested positive for Flu B and negative for Covid. Patient reports she was given medication for the flu by PCP and two shots. Advised patient to d/c paxlovid. Discussed with patient that flu swabs can be negative when early on and then test positive later on. Pt reports having no current side effects for medications. Discussed with patient and wife that all grievances will be sent to clinic manager. Yuliya Kerr MA stated she will send email to Steven about patient incident.

## 2024-02-28 ENCOUNTER — PATIENT MESSAGE (OUTPATIENT)
Dept: RESEARCH | Facility: HOSPITAL | Age: 59
End: 2024-02-28
Payer: COMMERCIAL

## 2024-04-17 ENCOUNTER — FOLLOW UP (OUTPATIENT)
Dept: URBAN - METROPOLITAN AREA CLINIC 26 | Facility: CLINIC | Age: 59
End: 2024-04-17

## 2024-04-17 DIAGNOSIS — H34.8310: ICD-10-CM

## 2024-04-17 DIAGNOSIS — H02.831: ICD-10-CM

## 2024-04-17 DIAGNOSIS — H21.02: ICD-10-CM

## 2024-04-17 DIAGNOSIS — H35.372: ICD-10-CM

## 2024-04-17 DIAGNOSIS — H04.123: ICD-10-CM

## 2024-04-17 DIAGNOSIS — I10: ICD-10-CM

## 2024-04-17 DIAGNOSIS — H40.1131: ICD-10-CM

## 2024-04-17 DIAGNOSIS — H02.834: ICD-10-CM

## 2024-04-17 PROCEDURE — 92014 COMPRE OPH EXAM EST PT 1/>: CPT

## 2024-04-17 PROCEDURE — 92134 CPTRZ OPH DX IMG PST SGM RTA: CPT

## 2024-04-17 PROCEDURE — 92020 GONIOSCOPY: CPT

## 2024-04-17 ASSESSMENT — TONOMETRY
OS_IOP_MMHG: 35
OD_IOP_MMHG: 18
OS_IOP_MMHG: 32

## 2024-04-17 ASSESSMENT — VISUAL ACUITY
OD_CC: 20/20-1
OS_CC: 20/20-

## 2024-07-24 ENCOUNTER — FOLLOW UP (OUTPATIENT)
Dept: URBAN - METROPOLITAN AREA CLINIC 26 | Facility: CLINIC | Age: 59
End: 2024-07-24

## 2024-07-24 VITALS — HEIGHT: 60 IN | BODY MASS INDEX: 50.06 KG/M2 | WEIGHT: 255 LBS

## 2024-07-24 DIAGNOSIS — H34.8310: ICD-10-CM

## 2024-07-24 DIAGNOSIS — H21.02: ICD-10-CM

## 2024-07-24 DIAGNOSIS — H02.834: ICD-10-CM

## 2024-07-24 DIAGNOSIS — H02.831: ICD-10-CM

## 2024-07-24 DIAGNOSIS — H35.372: ICD-10-CM

## 2024-07-24 DIAGNOSIS — H40.1131: ICD-10-CM

## 2024-07-24 DIAGNOSIS — I10: ICD-10-CM

## 2024-07-24 DIAGNOSIS — H04.123: ICD-10-CM

## 2024-07-24 PROCEDURE — 92014 COMPRE OPH EXAM EST PT 1/>: CPT | Mod: 25

## 2024-07-24 PROCEDURE — 92134 CPTRZ OPH DX IMG PST SGM RTA: CPT

## 2024-07-24 PROCEDURE — 67028 INJECTION EYE DRUG: CPT

## 2024-07-24 PROCEDURE — 92250 FUNDUS PHOTOGRAPHY W/I&R: CPT | Mod: 59

## 2024-07-24 ASSESSMENT — VISUAL ACUITY
OS_SC: 20/20-1
OD_SC: 20/25+2

## 2024-07-24 ASSESSMENT — TONOMETRY
OD_IOP_MMHG: 20
OS_IOP_MMHG: 20

## 2024-10-29 ENCOUNTER — ADDENDUM (OUTPATIENT)
Dept: URBAN - METROPOLITAN AREA CLINIC 26 | Facility: CLINIC | Age: 59
End: 2024-10-29

## 2024-12-03 ENCOUNTER — COMPREHENSIVE EXAM (OUTPATIENT)
Age: 59
End: 2024-12-03

## 2024-12-03 DIAGNOSIS — H21.02: ICD-10-CM

## 2024-12-03 DIAGNOSIS — H04.123: ICD-10-CM

## 2024-12-03 DIAGNOSIS — H34.8310: ICD-10-CM

## 2024-12-03 DIAGNOSIS — Z96.1: ICD-10-CM

## 2024-12-03 DIAGNOSIS — H02.834: ICD-10-CM

## 2024-12-03 DIAGNOSIS — H25.11: ICD-10-CM

## 2024-12-03 DIAGNOSIS — H40.1131: ICD-10-CM

## 2024-12-03 DIAGNOSIS — H35.372: ICD-10-CM

## 2024-12-03 DIAGNOSIS — I10: ICD-10-CM

## 2024-12-03 DIAGNOSIS — H02.831: ICD-10-CM

## 2024-12-03 PROCEDURE — 92250 FUNDUS PHOTOGRAPHY W/I&R: CPT | Mod: 59

## 2024-12-03 PROCEDURE — 92235 FLUORESCEIN ANGRPH MLTIFRAME: CPT

## 2024-12-03 PROCEDURE — 92134 CPTRZ OPH DX IMG PST SGM RTA: CPT

## 2024-12-03 PROCEDURE — 67028 INJECTION EYE DRUG: CPT

## 2024-12-03 PROCEDURE — 92014 COMPRE OPH EXAM EST PT 1/>: CPT | Mod: 25

## 2025-01-20 ENCOUNTER — CLINIC PROCEDURE ONLY (OUTPATIENT)
Age: 60
End: 2025-01-20

## 2025-01-20 DIAGNOSIS — H35.372: ICD-10-CM

## 2025-01-20 DIAGNOSIS — H40.1131: ICD-10-CM

## 2025-01-20 DIAGNOSIS — H34.8310: ICD-10-CM

## 2025-01-20 PROCEDURE — 67028 INJECTION EYE DRUG: CPT

## 2025-01-20 PROCEDURE — 92250 FUNDUS PHOTOGRAPHY W/I&R: CPT | Mod: 59

## 2025-01-20 PROCEDURE — 92134 CPTRZ OPH DX IMG PST SGM RTA: CPT

## 2025-03-04 ENCOUNTER — CLINIC PROCEDURE ONLY (OUTPATIENT)
Age: 60
End: 2025-03-04

## 2025-03-04 DIAGNOSIS — H35.372: ICD-10-CM

## 2025-03-04 DIAGNOSIS — H34.8310: ICD-10-CM

## 2025-03-04 DIAGNOSIS — H40.1131: ICD-10-CM

## 2025-03-04 PROCEDURE — 92250 FUNDUS PHOTOGRAPHY W/I&R: CPT | Mod: 59

## 2025-03-04 PROCEDURE — 92134 CPTRZ OPH DX IMG PST SGM RTA: CPT

## 2025-03-04 PROCEDURE — 67028 INJECTION EYE DRUG: CPT

## 2025-04-01 ENCOUNTER — CLINIC PROCEDURE ONLY (OUTPATIENT)
Age: 60
End: 2025-04-01

## 2025-04-01 DIAGNOSIS — H35.372: ICD-10-CM

## 2025-04-01 DIAGNOSIS — H34.8310: ICD-10-CM

## 2025-04-01 DIAGNOSIS — H40.1131: ICD-10-CM

## 2025-04-01 PROCEDURE — 67028 INJECTION EYE DRUG: CPT

## 2025-04-01 PROCEDURE — 92134 CPTRZ OPH DX IMG PST SGM RTA: CPT

## 2025-04-01 PROCEDURE — 92250 FUNDUS PHOTOGRAPHY W/I&R: CPT | Mod: 59

## 2025-04-29 ENCOUNTER — CLINIC PROCEDURE ONLY (OUTPATIENT)
Age: 60
End: 2025-04-29

## 2025-04-29 DIAGNOSIS — H34.8310: ICD-10-CM

## 2025-04-29 DIAGNOSIS — H35.372: ICD-10-CM

## 2025-04-29 DIAGNOSIS — H40.1131: ICD-10-CM

## 2025-04-29 PROCEDURE — 92250 FUNDUS PHOTOGRAPHY W/I&R: CPT | Mod: 59

## 2025-04-29 PROCEDURE — 92134 CPTRZ OPH DX IMG PST SGM RTA: CPT

## 2025-04-29 PROCEDURE — 67028 INJECTION EYE DRUG: CPT

## 2025-05-27 ENCOUNTER — CLINIC PROCEDURE ONLY (OUTPATIENT)
Age: 60
End: 2025-05-27

## 2025-05-27 DIAGNOSIS — H35.372: ICD-10-CM

## 2025-05-27 DIAGNOSIS — H34.8310: ICD-10-CM

## 2025-05-27 DIAGNOSIS — H40.1131: ICD-10-CM

## 2025-05-27 PROCEDURE — 92134 CPTRZ OPH DX IMG PST SGM RTA: CPT

## 2025-05-27 PROCEDURE — 92250 FUNDUS PHOTOGRAPHY W/I&R: CPT | Mod: 59

## 2025-05-27 PROCEDURE — 67028 INJECTION EYE DRUG: CPT

## 2025-06-30 ENCOUNTER — CLINIC PROCEDURE ONLY (OUTPATIENT)
Age: 60
End: 2025-06-30

## 2025-06-30 DIAGNOSIS — H35.372: ICD-10-CM

## 2025-06-30 DIAGNOSIS — H40.1131: ICD-10-CM

## 2025-06-30 DIAGNOSIS — H34.8310: ICD-10-CM

## 2025-06-30 PROCEDURE — 92134 CPTRZ OPH DX IMG PST SGM RTA: CPT

## 2025-06-30 PROCEDURE — 67028 INJECTION EYE DRUG: CPT

## 2025-06-30 PROCEDURE — 92250 FUNDUS PHOTOGRAPHY W/I&R: CPT | Mod: 59

## 2025-07-28 ENCOUNTER — CLINIC PROCEDURE ONLY (OUTPATIENT)
Age: 60
End: 2025-07-28

## 2025-07-28 DIAGNOSIS — H40.1131: ICD-10-CM

## 2025-07-28 DIAGNOSIS — H35.372: ICD-10-CM

## 2025-07-28 DIAGNOSIS — H34.8310: ICD-10-CM

## 2025-07-28 PROCEDURE — 92134 CPTRZ OPH DX IMG PST SGM RTA: CPT

## 2025-07-28 PROCEDURE — 92250 FUNDUS PHOTOGRAPHY W/I&R: CPT

## 2025-07-28 RX ORDER — PREDNISOLONE ACETATE 10 MG/ML: 1 SUSPENSION/ DROPS OPHTHALMIC TWICE A DAY

## 2025-08-06 ENCOUNTER — CLINIC PROCEDURE ONLY (OUTPATIENT)
Age: 60
End: 2025-08-06

## 2025-08-06 DIAGNOSIS — H40.1131: ICD-10-CM

## 2025-08-06 PROCEDURE — 67028 INJECTION EYE DRUG: CPT

## 2025-08-20 ENCOUNTER — TECH ONLY (OUTPATIENT)
Age: 60
End: 2025-08-20

## 2025-08-20 DIAGNOSIS — H35.372: ICD-10-CM

## 2025-08-20 DIAGNOSIS — H02.831: ICD-10-CM

## 2025-08-20 DIAGNOSIS — H02.834: ICD-10-CM

## 2025-08-20 DIAGNOSIS — H40.1131: ICD-10-CM

## 2025-08-20 DIAGNOSIS — H04.123: ICD-10-CM

## 2025-08-20 DIAGNOSIS — H21.02: ICD-10-CM

## 2025-08-20 DIAGNOSIS — Z96.1: ICD-10-CM

## 2025-08-20 DIAGNOSIS — I10: ICD-10-CM

## 2025-08-20 DIAGNOSIS — H34.8310: ICD-10-CM

## 2025-08-20 PROCEDURE — 99213 OFFICE O/P EST LOW 20 MIN: CPT
